# Patient Record
Sex: MALE | Race: WHITE | Employment: FULL TIME | URBAN - METROPOLITAN AREA
[De-identification: names, ages, dates, MRNs, and addresses within clinical notes are randomized per-mention and may not be internally consistent; named-entity substitution may affect disease eponyms.]

---

## 2018-06-04 ENCOUNTER — OFFICE VISIT (OUTPATIENT)
Dept: FAMILY MEDICINE CLINIC | Facility: CLINIC | Age: 33
End: 2018-06-04

## 2018-06-04 VITALS — DIASTOLIC BLOOD PRESSURE: 84 MMHG | SYSTOLIC BLOOD PRESSURE: 138 MMHG | WEIGHT: 173 LBS | HEART RATE: 56 BPM

## 2018-06-04 DIAGNOSIS — M25.561 ACUTE PAIN OF RIGHT KNEE: ICD-10-CM

## 2018-06-04 DIAGNOSIS — M25.511 ACUTE PAIN OF RIGHT SHOULDER: Primary | ICD-10-CM

## 2018-06-04 PROCEDURE — 99203 OFFICE O/P NEW LOW 30 MIN: CPT | Performed by: FAMILY MEDICINE

## 2018-06-04 PROCEDURE — 99212 OFFICE O/P EST SF 10 MIN: CPT | Performed by: FAMILY MEDICINE

## 2018-06-04 RX ORDER — DICLOFENAC SODIUM 75 MG/1
75 TABLET, DELAYED RELEASE ORAL 2 TIMES DAILY
Qty: 60 TABLET | Refills: 1 | Status: SHIPPED | OUTPATIENT
Start: 2018-06-04 | End: 2018-10-12

## 2018-10-05 ENCOUNTER — TELEPHONE (OUTPATIENT)
Dept: FAMILY MEDICINE CLINIC | Facility: CLINIC | Age: 33
End: 2018-10-05

## 2018-10-05 ENCOUNTER — HOSPITAL ENCOUNTER (OUTPATIENT)
Age: 33
Discharge: HOME | End: 2018-10-05
Payer: COMMERCIAL

## 2018-10-05 ENCOUNTER — NURSE TRIAGE (OUTPATIENT)
Dept: OTHER | Age: 33
End: 2018-10-05

## 2018-10-05 VITALS
TEMPERATURE: 99 F | DIASTOLIC BLOOD PRESSURE: 97 MMHG | RESPIRATION RATE: 16 BRPM | SYSTOLIC BLOOD PRESSURE: 153 MMHG | WEIGHT: 151 LBS | HEART RATE: 53 BPM | OXYGEN SATURATION: 100 %

## 2018-10-05 DIAGNOSIS — M95.11 CAULIFLOWER EAR, RIGHT: Primary | ICD-10-CM

## 2018-10-05 PROCEDURE — 99202 OFFICE O/P NEW SF 15 MIN: CPT

## 2018-10-05 PROCEDURE — 99212 OFFICE O/P EST SF 10 MIN: CPT

## 2018-10-05 NOTE — ED PROVIDER NOTES
Patient presents with:  Ear Pain      HPI:     Norma Dumont is a 26year old male with no significant past medical history presents with right ear injury.   Patient reports he was doing jujitsu on Wednesday and noticed he had swelling to his ear after being found for this or any previous visit (from the past 10 hour(s)). Diagnosis:    ICD-10-CM    1. Cauliflower ear, right M95.11        All results reviewed and discussed with patient. See AVS for detailed discharge instructions for your condition today.

## 2018-10-05 NOTE — TELEPHONE ENCOUNTER
Action Requested: Summary for Provider     []  Critical Lab, Recommendations Needed  [] Need Additional Advice  [x]   FYI    []   Need Orders  [] Need Medications Sent to Pharmacy  []  Other     SUMMARY: Patient requesting appt today with [facility] for right cau

## 2018-10-05 NOTE — ED INITIAL ASSESSMENT (HPI)
Right ear injury during jujitzu on Wednesday at 2030 pt drained ear with a insulin needle yesterday and get 0.3ml blood and pt concerned with swelling

## 2018-10-05 NOTE — TELEPHONE ENCOUNTER
Patient called requesting a referral to see Dr. Norbert An. Pt was seen at urgent care today and referred to Dr. Norbert An for follow up. Pended referral. Please review diagnosis and sign off if you agree.     Thank you,  [facility] [phone]

## 2018-10-09 ENCOUNTER — OFFICE VISIT (OUTPATIENT)
Dept: OTOLARYNGOLOGY | Facility: CLINIC | Age: 33
End: 2018-10-09

## 2018-10-09 VITALS
SYSTOLIC BLOOD PRESSURE: 137 MMHG | DIASTOLIC BLOOD PRESSURE: 92 MMHG | WEIGHT: 151 LBS | HEIGHT: 69 IN | TEMPERATURE: 98 F | BODY MASS INDEX: 22.36 KG/M2

## 2018-10-09 DIAGNOSIS — S00.431A EAR HEMATOMA, RIGHT, INITIAL ENCOUNTER: Primary | ICD-10-CM

## 2018-10-09 PROCEDURE — 99212 OFFICE O/P EST SF 10 MIN: CPT | Performed by: OTOLARYNGOLOGY

## 2018-10-09 PROCEDURE — 99244 OFF/OP CNSLTJ NEW/EST MOD 40: CPT | Performed by: OTOLARYNGOLOGY

## 2018-10-09 NOTE — PROGRESS NOTES
Angle Bernal is a 26year old male.   Patient presents with:  Ear Problem: pt had an injury to the right ear last week Wednesday, c/o swelling of right ear even after draining the ear       HISTORY OF PRESENT ILLNESS  He presents a history of injury to his r Endocrine Negative Cold intolerance and heat intolerance. Neuro Negative Tremors. Psych Negative Anxiety and depression. Integumentary Negative Frequent skin infections, pigment change and rash.    Hema/Lymph Negative Easy bleeding and easy bruising draining it. I have recommended that he undergo more formal incision and drainage and placement of a bolster and tacking stitches. He understands the risks of recurrence and poor cosmesis. He accepts these risks and wishes to proceed.             Martha Aguirre.

## 2018-10-12 ENCOUNTER — HOSPITAL ENCOUNTER (OUTPATIENT)
Age: 33
Discharge: HOME | End: 2018-10-12
Attending: EMERGENCY MEDICINE
Payer: COMMERCIAL

## 2018-10-12 VITALS
BODY MASS INDEX: 22.36 KG/M2 | WEIGHT: 151 LBS | RESPIRATION RATE: 18 BRPM | HEART RATE: 49 BPM | SYSTOLIC BLOOD PRESSURE: 138 MMHG | OXYGEN SATURATION: 100 % | HEIGHT: 69 IN | TEMPERATURE: 98 F | DIASTOLIC BLOOD PRESSURE: 85 MMHG

## 2018-10-12 DIAGNOSIS — J06.9 UPPER RESPIRATORY TRACT INFECTION, UNSPECIFIED TYPE: Primary | ICD-10-CM

## 2018-10-12 PROCEDURE — 99212 OFFICE O/P EST SF 10 MIN: CPT

## 2018-10-12 PROCEDURE — 99213 OFFICE O/P EST LOW 20 MIN: CPT

## 2018-10-12 NOTE — ED PROVIDER NOTES
Patient Seen in: [facility] In [address] [place] [address]    History   Patient presents with:  Cough/URI    Stated Complaint: cough    HPI    The patient is a 41-year-old male who presents with 5 days of cough and mild congestion/postnasal drip.   Skylar Goldberg to light. Neck: Normal range of motion and full passive range of motion without pain. Neck supple. No neck rigidity. No Brudzinski's sign and no Kernig's sign noted.    Cardiovascular: Normal rate, regular rhythm, normal heart sounds and intact distal pul

## 2018-10-30 ENCOUNTER — TELEPHONE (OUTPATIENT)
Dept: OTOLARYNGOLOGY | Facility: CLINIC | Age: 33
End: 2018-10-30

## 2018-11-06 ENCOUNTER — OFFICE VISIT (OUTPATIENT)
Dept: OTOLARYNGOLOGY | Facility: CLINIC | Age: 33
End: 2018-11-06

## 2018-11-06 VITALS
BODY MASS INDEX: 22.81 KG/M2 | WEIGHT: 154 LBS | SYSTOLIC BLOOD PRESSURE: 136 MMHG | DIASTOLIC BLOOD PRESSURE: 88 MMHG | TEMPERATURE: 98 F | HEIGHT: 69 IN

## 2018-11-06 DIAGNOSIS — S00.431A EAR HEMATOMA, RIGHT, INITIAL ENCOUNTER: Primary | ICD-10-CM

## 2018-11-06 PROCEDURE — 99212 OFFICE O/P EST SF 10 MIN: CPT | Performed by: OTOLARYNGOLOGY

## 2018-11-06 PROCEDURE — 99214 OFFICE O/P EST MOD 30 MIN: CPT | Performed by: OTOLARYNGOLOGY

## 2018-11-06 NOTE — PROGRESS NOTES
Dat Nunez is a 26year old male. Patient presents with:   Follow - Up: regarding right ear hematoma, pt injury right ear last week Monday, pt would like to discuss surgery       HISTORY OF PRESENT ILLNESS  He presents a history of injury to his right ear Negative Blurred vision and vision changes. Respiratory Negative Dyspnea and wheezing. Cardio Negative Chest pain, irregular heartbeat/palpitations and syncope. GI Negative Abdominal pain and diarrhea.    Endocrine Negative Cold intolerance and heat i initial encounter  Recurrence of his ear hematoma in the right. We once again discussed incision and drainage and placement of a mattress stitch to prevent recurrence. He wishes to proceed. He understands the risk of poor cosmesis. Kaleb Alfaro.  Or

## 2018-11-27 ENCOUNTER — TELEPHONE (OUTPATIENT)
Dept: OTOLARYNGOLOGY | Facility: CLINIC | Age: 33
End: 2018-11-27

## 2018-11-27 NOTE — TELEPHONE ENCOUNTER
Dr. Darlene Halsted, pt called to cancel states has work emergency and will not be able to take it tomorrow. Pt states will call back to reschedule.

## 2019-11-23 ENCOUNTER — OFFICE VISIT (OUTPATIENT)
Dept: FAMILY MEDICINE CLINIC | Facility: CLINIC | Age: 34
End: 2019-11-23

## 2019-11-23 VITALS
OXYGEN SATURATION: 98 % | SYSTOLIC BLOOD PRESSURE: 124 MMHG | BODY MASS INDEX: 24.68 KG/M2 | HEIGHT: 69 IN | TEMPERATURE: 98 F | WEIGHT: 166.63 LBS | DIASTOLIC BLOOD PRESSURE: 80 MMHG | HEART RATE: 54 BPM

## 2019-11-23 DIAGNOSIS — J01.00 ACUTE NON-RECURRENT MAXILLARY SINUSITIS: Primary | ICD-10-CM

## 2019-11-23 DIAGNOSIS — J20.9 ACUTE BRONCHITIS, UNSPECIFIED ORGANISM: ICD-10-CM

## 2019-11-23 PROCEDURE — 99202 OFFICE O/P NEW SF 15 MIN: CPT | Performed by: PHYSICIAN ASSISTANT

## 2019-11-23 RX ORDER — METHYLPREDNISOLONE 4 MG/1
TABLET ORAL
Qty: 1 KIT | Refills: 0 | Status: SHIPPED | OUTPATIENT
Start: 2019-11-23

## 2019-11-23 RX ORDER — AMOXICILLIN AND CLAVULANATE POTASSIUM 875; 125 MG/1; MG/1
1 TABLET, FILM COATED ORAL 2 TIMES DAILY
Qty: 14 TABLET | Refills: 0 | Status: SHIPPED | OUTPATIENT
Start: 2019-11-23 | End: 2019-11-30

## 2019-11-23 NOTE — PROGRESS NOTES
Patient presents with:  URI: X 22 day, productive cough, post nasal drip, burning throat, fatigue, some sinus pressure    HPI:   Tyler Junior is a 37year old male who presents for sinus congestion and cough for  3  weeks.  Cough started gradually, tight, normocephalic,  + tenderness on palpation of maxillary sinuses  EYES: conjunctiva clear, EOM intact  EARS: TM's clear gray, no bulging, no retraction, no fluid, bony landmarks sharp bilaterally  NOSE: nostrils patent, yellow nasal mucous, nasal mucosa mild for nasal symptoms as instructed  · Follow up with your health care provider if your symptoms do not improve in 3-5 days. · You can use an over-the-counter decongestant.   Sudafed 12 hour or Afrin Nasal Spray as per package directions may help with nasal/ antibiotic use    Probiotics or yogurt daily during antibioitic use may help decrease stomach upset and restore good bacteria to the gut. Take the probiotic at least 2 -3 hours after taking the antibiotic.   Examples include:  · Yogurt: eat 4-8 oz twice da

## 2019-11-23 NOTE — PATIENT INSTRUCTIONS
Please follow up with your PCP if no improvement within 5-7 days. Go directly to the ER for any acute worsening of symptoms. · Rest.  Drink lots of fluids. · Complete the steroid pack as directed. · Mucinex is a good over-the-counter mucous thinner.   · itching, throat tightness, or shortness of breath while on the antibiotic or shortly after completion, please call your PCP immediately and stop your antibiotic. This may be an allergic reaction.    · If you were prescribed doxycycline, it can make your sk

## 2020-11-28 ENCOUNTER — OFFICE VISIT (OUTPATIENT)
Dept: FAMILY MEDICINE CLINIC | Facility: CLINIC | Age: 35
End: 2020-11-28

## 2020-11-28 VITALS
SYSTOLIC BLOOD PRESSURE: 143 MMHG | HEIGHT: 69 IN | TEMPERATURE: 98 F | DIASTOLIC BLOOD PRESSURE: 79 MMHG | BODY MASS INDEX: 22.96 KG/M2 | OXYGEN SATURATION: 97 % | WEIGHT: 155 LBS | HEART RATE: 60 BPM

## 2020-11-28 DIAGNOSIS — Z20.822 SUSPECTED COVID-19 VIRUS INFECTION: Primary | ICD-10-CM

## 2020-11-28 DIAGNOSIS — R05.9 COUGH: ICD-10-CM

## 2020-11-28 DIAGNOSIS — R09.81 NASAL CONGESTION: ICD-10-CM

## 2020-11-28 PROCEDURE — 3077F SYST BP >= 140 MM HG: CPT | Performed by: NURSE PRACTITIONER

## 2020-11-28 PROCEDURE — 99213 OFFICE O/P EST LOW 20 MIN: CPT | Performed by: NURSE PRACTITIONER

## 2020-11-28 PROCEDURE — 3078F DIAST BP <80 MM HG: CPT | Performed by: NURSE PRACTITIONER

## 2020-11-28 PROCEDURE — 3008F BODY MASS INDEX DOCD: CPT | Performed by: NURSE PRACTITIONER

## 2020-11-28 RX ORDER — DOXYCYCLINE HYCLATE 100 MG/1
100 CAPSULE ORAL 2 TIMES DAILY
COMMUNITY

## 2020-11-28 NOTE — PROGRESS NOTES
CHIEF COMPLAINT:   Patient presents with:  Cough: and nasal congestion x3-4 days        HPI:   Raphael Hodge is a 38year old male presents to clinic with complaints of cough and nasal congestion x3-4 days. Reports having a dental procedure 6 days ago.  Lynn Burgos LUNGS: clear to auscultation bilaterally. Breathing is non labored. CARDIO: RRR without murmur  LYMPH: no lymphadenopathy. No results found for this or any previous visit (from the past 24 hour(s)). ASSESSMENT AND PLAN:   Assessment: 1.  Suspected · Wear a cloth face mask around other people. During a public health emergency, medical face masks may be reserved for healthcare workers. You may need to make a cloth face mask of your own. You can do this using a bandana, T-shirt, or other cloth.  The CDC · Wear a face mask. This is to protect other people from your germs. If you are not able to wear a mask, your caregivers should. During a public health emergency, medical face masks may be reserved for healthcare workers.  You may need to make a cloth face · Taking over-the-counter (OTC) pain medicine. These are used to help ease pain and reduce fever. Follow your healthcare provider's instructions for which OTC medicine to use.   If you've been in the hospital for suspected or confirmed COVID-19 and now are When you are sick with COVID-19, you should stay away from other people. This is called self-isolation.    Your limits are different if you've had COVID-19 in the last 3 months but are fully recovered without symptoms and you have been exposed to someone wi If you have a weak immune system and COVID-19, or if you've had severe COVID-19,  your instructions on when to stop isolation will be somewhat different. Some conditions and treatments can cause a weak immune system.  These include cancer treatment, bone ma · Confusion or trouble waking  · Fainting or loss of consciousness  · Coughing up blood  Going home from the hospital   If you were diagnosed with COVID-19 and were recently discharged from the hospital:   · Follow the instructions above for self-care and

## 2020-11-28 NOTE — PATIENT INSTRUCTIONS
-results will be back in 3 days.  [facility].  -go to the ED if shortness of breath, if you feel confused or fever is not decreasing with medication  -you should remained quarantined at least until results are back (duration of need to go to a hospital or clinic, expect that the healthcare staff will wear protective equipment such as masks, gowns, gloves, and eye protection. You may be advised to wait in or enter through a separate area.  This is to prevent the possible virus from tissue into the trash. If you don't have tissues, cough or sneeze into the bend of your elbow. · Wash your hands often. Self-care at home   There is currently no vaccine or medicine approved to prevent the virus.  The FDA has approved an antiviral medic know the safety of COVID-19 convalescent plasma or how well it works. Research continues. The FDA has approved it for emergency use in certain people with serious or life-threatening COVID-19.    Home care for a sick person   · Follow all instructions from all 3 of these are true:   1. You have had no fever for at least 24 hours. This means no fever without medicine that reduces fever, such as acetaminophen, for at least 24 hours. 2. Your symptoms such as cough or trouble breathing have improved.   3. It has health condition that can be made worse by wearing a mask  · Anyone who is unconscious or unable to remove the face covering without help. See the CDC's guidance on who should not wear a face mask.     When to call your healthcare provider  Call your health

## (undated) NOTE — LETTER
Cristina Parry  [address]       10/09/18        Patient: Figueroa Patel   YOB: 1985   Date of Visit: 10/9/2018       Dear  Dr. Sheri Shabazz,      Thank you for referring Matthewjosh Patel to my practice.   Please find